# Patient Record
Sex: FEMALE | Race: OTHER | Employment: STUDENT | URBAN - METROPOLITAN AREA
[De-identification: names, ages, dates, MRNs, and addresses within clinical notes are randomized per-mention and may not be internally consistent; named-entity substitution may affect disease eponyms.]

---
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
## 2025-06-30 NOTE — PLAN OF CARE
Spoke with Marley @ [facility]. Recommended sooner appointment tomorrow with Dr. Robles for incisional check-splint application. Marley to callback after arrangement of transportation.    Upon assessment, VSS, afebrile. Pt rates pain to throat 2/10 and mother states, \"this is the best the pain has been. \" Lortab elixir given as requested. Pt tolerated liquids for breakfast. Pt denies difficulty swallowing. Denies nausea. Pt denies JACOB/SOB. trends  - Administer supportive blood products/factors, fluids and medications as ordered and appropriate  - Administer supportive blood products/factors as ordered and appropriate  Outcome: Adequate for Discharge

## (undated) DEVICE — GAMMEX® PI HYBRID SIZE 8, STERILE POWDER-FREE SURGICAL GLOVE, POLYISOPRENE AND NEOPRENE BLEND: Brand: GAMMEX

## (undated) DEVICE — CAUTERY BLADE 2IN INS E1455

## (undated) DEVICE — CAUTERY PENCIL

## (undated) DEVICE — SOL  .9 1000ML BTL

## (undated) DEVICE — T & A CDS: Brand: MEDLINE INDUSTRIES, INC.

## (undated) NOTE — ED AVS SNAPSHOT
Rodney Ruiz   MRN: [id number]    Department:  [facility] Emergency Department   Date of Visit:  2/21/2020           Disclosure     Insurance plans vary and the physician(s) referred by the ER may not be covered by your plan.  Please contact you tell this physician (or your personal doctor if your instructions are to return to your personal doctor) about any new or lasting problems. The primary care or specialist physician will see patients referred from the [facility] Emergency Department.  Karlie Sotelo